# Patient Record
Sex: MALE | Race: WHITE | URBAN - METROPOLITAN AREA
[De-identification: names, ages, dates, MRNs, and addresses within clinical notes are randomized per-mention and may not be internally consistent; named-entity substitution may affect disease eponyms.]

---

## 2017-02-02 ENCOUNTER — IMPORTED ENCOUNTER (OUTPATIENT)
Dept: URBAN - METROPOLITAN AREA CLINIC 50 | Facility: CLINIC | Age: 77
End: 2017-02-02

## 2017-02-02 NOTE — PATIENT DISCUSSION
"""Patient elects to change glasses. Rx given. "" ""Informed patient that their cataract is visually significant and meets the criteria for cataract surgery to increase their vision and decrease their glare symptoms.  RBALs of surgery discussed

## 2017-02-13 NOTE — PATIENT DISCUSSION
Optic disc drusen ou:   Stable, patient has no visual abnormalities at this time . will follow yearly by fundus exam and optic disc photos.

## 2017-02-13 NOTE — PATIENT DISCUSSION
Discussed options of placement of punctal plugs verses following. The risks, benefits, and alternatives include anesthesia, bleeding, infection, inflammation. The patient understands and desires to proceed with punctal plugs to improve dryness.

## 2017-02-13 NOTE — PATIENT DISCUSSION
"ARMD (Dry) Counseling: I have instructed the patient to take an AREDS 2 vitamin mixture to minimize the risk of developing ""wet"" macular degeneration disease progression. The importance of daily monitoring with Amsler grid was emphasized and an amsler grid was provided if the patient did not have one. The patient was advised to call the office if new symptoms of persistent blurring or distortion of vision arises as evaluation and possible treatment is necessary to preserve as much vision as possible. Return for follow-up as scheduled. "

## 2017-02-13 NOTE — PATIENT DISCUSSION
DRY EYES : Discussed with patient the importance of keeping the eye moist and the symptoms associated with dry eyes including blurry vision, tearing, burning, and shashi sensation. Tear Lab was performed today to evaluate the severity of dryness. Advised patient to minimize use of any fans blowing directly on the face. Advised patient to continue with over the counter artificial tears 2-3 times daily.

## 2017-02-13 NOTE — PATIENT DISCUSSION
PCO OU:  Not visually significant at this time. Re-eval 1 year. If vision changes call office to make an appointment.

## 2017-02-13 NOTE — PATIENT DISCUSSION
Posterior Capsular Fibrosis Counseling: The diagnosis of posterior capsular fibrosis (PCF), also referred to as a secondary cataract or posterior capsular opacification (PCO), was discussed with the patient. The patient understands that their symptoms and limitations are likely related to this condition.

## 2017-02-28 ENCOUNTER — IMPORTED ENCOUNTER (OUTPATIENT)
Dept: URBAN - METROPOLITAN AREA CLINIC 50 | Facility: CLINIC | Age: 77
End: 2017-02-28

## 2017-03-27 NOTE — PATIENT DISCUSSION
Central retinal artery occlusion os : will sched 24-2 to see visual field defect , blood pressure was checked in office today 152/79 last night 186/80.   patient states bottom half of vision went first.

## 2017-04-03 NOTE — PATIENT DISCUSSION
temporal artery biopsy:spoke with primary care provider Dr. Freddie Noel and discussed results from [facility], patient to be sched. for biopsy to reduce sed rate which was 36, cbc was normal and ct was normal . discussed options with patient biopsy verses follow r/b/a's anth, inflam , infect, bleed . patient understands and desires to proceed with biopsy.

## 2017-06-13 NOTE — PATIENT DISCUSSION
Giant cell arteritis : Continue Prednisone, methotrexate, and weekly injection given by specialist all looks stable at this time with vision. 24-2 visual field to be scheduled in order to  determine stability of peripheral vision .

## 2017-08-22 NOTE — PATIENT DISCUSSION
Giant cell arteritis: all is stable at this point, continue following with pcp, will follow in 2 months visual field in one month.

## 2017-10-12 NOTE — PATIENT DISCUSSION
Giant cell arteritis: right eye visual field improved from past . follow back in 3 months for 24-2 and follow up afterwards

## 2017-10-26 ENCOUNTER — IMPORTED ENCOUNTER (OUTPATIENT)
Dept: URBAN - METROPOLITAN AREA CLINIC 50 | Facility: CLINIC | Age: 77
End: 2017-10-26

## 2017-11-27 ENCOUNTER — IMPORTED ENCOUNTER (OUTPATIENT)
Dept: URBAN - METROPOLITAN AREA CLINIC 50 | Facility: CLINIC | Age: 77
End: 2017-11-27

## 2018-01-23 ENCOUNTER — IMPORTED ENCOUNTER (OUTPATIENT)
Dept: URBAN - METROPOLITAN AREA CLINIC 50 | Facility: CLINIC | Age: 78
End: 2018-01-23

## 2018-01-25 ENCOUNTER — IMPORTED ENCOUNTER (OUTPATIENT)
Dept: URBAN - METROPOLITAN AREA CLINIC 50 | Facility: CLINIC | Age: 78
End: 2018-01-25

## 2018-01-25 NOTE — PATIENT DISCUSSION
"""Continue Warm compresses both eyes twice a day. "" ""Continue Artificial tears both eyes two - four times a day. "" ""Continue Lid Scrubs both eyes twice a day.  """

## 2018-01-29 NOTE — PATIENT DISCUSSION
Giant cell arteritis: no new progression , continue steroids. explained to patient her vision isn't fading in and out , the problem is contrast . will continue to follow .

## 2019-10-03 NOTE — PATIENT DISCUSSION
Vertex Distance: Plan: (Pending labs) increase isotretinoin from 40mg PO qd to 30mg PO bid Detail Level: Detailed

## 2019-10-31 NOTE — PATIENT DISCUSSION
DRY EYES : Discussed with patient the importance of keeping the eye moist and the symptoms associated with dry eyes including blurry vision, tearing, burning, and shashi sensation. Advised patient to minimize use of any fans blowing directly on the face. Advised patient to continue with artificial tears 2-3 times daily.

## 2019-10-31 NOTE — PATIENT DISCUSSION
New Prescription: Carolina P (brimonidine): drops: 0.1% 1 drop twice a day as directed into both eyes 10-

## 2019-10-31 NOTE — PATIENT DISCUSSION
SUBCONJUNCTIVAL HEMORRHAGE, OD: NO TREATMENT INDICATED.  REASSURANCE GIVEN THAT WILL RESOLVE WITH TIME

## 2019-10-31 NOTE — PATIENT DISCUSSION
POAG, OU: ELEVATED INTRAOCULAR PRESSURE, OU. PRESCRIBED ALPHAGAN P BID OU . RETURN FOR FOLLOW AS SCHEDULED.

## 2020-01-02 NOTE — PATIENT DISCUSSION
POAG, OU: INTRAOCULAR PRESSURE IS WITHIN ACCEPTABLE LIMITS. PT INSTRUCTED TO CONTINUE ALPHAGAN AND RETURN FOR FOLLOW-UP AS SCHEDULED.

## 2020-06-15 NOTE — PATIENT DISCUSSION
New Prescription: Alphagan P (brimonidine): drops: 0.1% 1 drop twice a day as directed into both eyes 06-

## 2020-06-15 NOTE — PATIENT DISCUSSION
POAG, OU: INTRAOCULAR PRESSURE IS WITHIN ACCEPTABLE LIMITS. PT INSTRUCTED TO CONTINUE ALPHAGAN P BID OU  AND RETURN FOR FOLLOW-UP AS SCHEDULED.

## 2021-04-17 ASSESSMENT — VISUAL ACUITY
OS_BAT: 20/60
OD_BAT: 20/60
OD_OTHER: 20/50. >20/400.
OS_CC: 20/40
OD_CC: 20/80
OD_OTHER: 20/60. 20/100.
OS_PH: 20/30
OS_BAT: 20/70
OS_CC: 20/40-1
OS_CC: J1
OS_OTHER: 20/70. 20/200.
OD_PH: 20/40-1
OD_CC: 20/40-
OD_BAT: 20/50
OD_CC: J1
OS_CC: 20/40-
OD_CC: 20/50-
OS_OTHER: 20/60. >20/400.
OD_PH: 20/30-2

## 2021-04-17 ASSESSMENT — TONOMETRY
OD_IOP_MMHG: 16
OD_IOP_MMHG: 16
OD_IOP_MMHG: 18
OS_IOP_MMHG: 16
OS_IOP_MMHG: 18
OS_IOP_MMHG: 17

## 2021-10-07 NOTE — PATIENT DISCUSSION
[facility] monitoring, AREDS2 vitamins, no smoking, green leafy vegetables discussed.